# Patient Record
Sex: FEMALE | Race: BLACK OR AFRICAN AMERICAN | NOT HISPANIC OR LATINO | URBAN - METROPOLITAN AREA
[De-identification: names, ages, dates, MRNs, and addresses within clinical notes are randomized per-mention and may not be internally consistent; named-entity substitution may affect disease eponyms.]

---

## 2021-10-27 ENCOUNTER — EMERGENCY (EMERGENCY)
Facility: HOSPITAL | Age: 71
LOS: 1 days | Discharge: HOME | End: 2021-10-27
Attending: EMERGENCY MEDICINE
Payer: MEDICARE

## 2021-10-27 VITALS
DIASTOLIC BLOOD PRESSURE: 81 MMHG | OXYGEN SATURATION: 100 % | SYSTOLIC BLOOD PRESSURE: 165 MMHG | HEART RATE: 84 BPM | HEIGHT: 66 IN | WEIGHT: 119.93 LBS | RESPIRATION RATE: 16 BRPM | TEMPERATURE: 99 F

## 2021-10-27 VITALS
DIASTOLIC BLOOD PRESSURE: 79 MMHG | RESPIRATION RATE: 18 BRPM | OXYGEN SATURATION: 98 % | TEMPERATURE: 98 F | SYSTOLIC BLOOD PRESSURE: 162 MMHG | HEART RATE: 62 BPM

## 2021-10-27 LAB
ALBUMIN SERPL ELPH-MCNC: 3.9 G/DL — SIGNIFICANT CHANGE UP (ref 3.3–5)
ALP SERPL-CCNC: 95 U/L — SIGNIFICANT CHANGE UP (ref 40–120)
ALT FLD-CCNC: 54 U/L — HIGH (ref 10–45)
ANION GAP SERPL CALC-SCNC: 15 MMOL/L — SIGNIFICANT CHANGE UP (ref 5–17)
APPEARANCE UR: CLEAR — SIGNIFICANT CHANGE UP
APTT BLD: 30 SEC — SIGNIFICANT CHANGE UP (ref 27.5–35.5)
AST SERPL-CCNC: 44 U/L — HIGH (ref 10–40)
BACTERIA # UR AUTO: NEGATIVE — SIGNIFICANT CHANGE UP
BASE EXCESS BLDV CALC-SCNC: -0.9 MMOL/L — SIGNIFICANT CHANGE UP (ref -2–2)
BASOPHILS # BLD AUTO: 0.05 K/UL — SIGNIFICANT CHANGE UP (ref 0–0.2)
BASOPHILS NFR BLD AUTO: 0.7 % — SIGNIFICANT CHANGE UP (ref 0–2)
BILIRUB SERPL-MCNC: 0.4 MG/DL — SIGNIFICANT CHANGE UP (ref 0.2–1.2)
BILIRUB UR-MCNC: NEGATIVE — SIGNIFICANT CHANGE UP
BUN SERPL-MCNC: 21 MG/DL — SIGNIFICANT CHANGE UP (ref 7–23)
CA-I SERPL-SCNC: 1.23 MMOL/L — SIGNIFICANT CHANGE UP (ref 1.15–1.33)
CALCIUM SERPL-MCNC: 9.1 MG/DL — SIGNIFICANT CHANGE UP (ref 8.4–10.5)
CHLORIDE BLDV-SCNC: 108 MMOL/L — SIGNIFICANT CHANGE UP (ref 96–108)
CHLORIDE SERPL-SCNC: 106 MMOL/L — SIGNIFICANT CHANGE UP (ref 96–108)
CO2 BLDV-SCNC: 27 MMOL/L — HIGH (ref 22–26)
CO2 SERPL-SCNC: 20 MMOL/L — LOW (ref 22–31)
COLOR SPEC: COLORLESS — SIGNIFICANT CHANGE UP
CREAT SERPL-MCNC: 1.33 MG/DL — HIGH (ref 0.5–1.3)
DIFF PNL FLD: NEGATIVE — SIGNIFICANT CHANGE UP
EOSINOPHIL # BLD AUTO: 0.24 K/UL — SIGNIFICANT CHANGE UP (ref 0–0.5)
EOSINOPHIL NFR BLD AUTO: 3.3 % — SIGNIFICANT CHANGE UP (ref 0–6)
EPI CELLS # UR: 0 /HPF — SIGNIFICANT CHANGE UP
GAS PNL BLDV: 139 MMOL/L — SIGNIFICANT CHANGE UP (ref 136–145)
GAS PNL BLDV: SIGNIFICANT CHANGE UP
GAS PNL BLDV: SIGNIFICANT CHANGE UP
GLUCOSE BLDV-MCNC: 94 MG/DL — SIGNIFICANT CHANGE UP (ref 70–99)
GLUCOSE SERPL-MCNC: 94 MG/DL — SIGNIFICANT CHANGE UP (ref 70–99)
GLUCOSE UR QL: NEGATIVE — SIGNIFICANT CHANGE UP
HCO3 BLDV-SCNC: 25 MMOL/L — SIGNIFICANT CHANGE UP (ref 22–29)
HCT VFR BLD CALC: 31.6 % — LOW (ref 34.5–45)
HCT VFR BLDA CALC: 30 % — LOW (ref 34.5–46.5)
HGB BLD CALC-MCNC: 10 G/DL — LOW (ref 11.7–16.1)
HGB BLD-MCNC: 9.5 G/DL — LOW (ref 11.5–15.5)
HYALINE CASTS # UR AUTO: 0 /LPF — SIGNIFICANT CHANGE UP (ref 0–2)
IMM GRANULOCYTES NFR BLD AUTO: 0.3 % — SIGNIFICANT CHANGE UP (ref 0–1.5)
INR BLD: 1.03 RATIO — SIGNIFICANT CHANGE UP (ref 0.88–1.16)
KETONES UR-MCNC: NEGATIVE — SIGNIFICANT CHANGE UP
LACTATE BLDV-MCNC: 0.9 MMOL/L — SIGNIFICANT CHANGE UP (ref 0.7–2)
LEUKOCYTE ESTERASE UR-ACNC: NEGATIVE — SIGNIFICANT CHANGE UP
LIDOCAIN IGE QN: 65 U/L — HIGH (ref 7–60)
LYMPHOCYTES # BLD AUTO: 1.9 K/UL — SIGNIFICANT CHANGE UP (ref 1–3.3)
LYMPHOCYTES # BLD AUTO: 26.3 % — SIGNIFICANT CHANGE UP (ref 13–44)
MCHC RBC-ENTMCNC: 24.2 PG — LOW (ref 27–34)
MCHC RBC-ENTMCNC: 30.1 GM/DL — LOW (ref 32–36)
MCV RBC AUTO: 80.6 FL — SIGNIFICANT CHANGE UP (ref 80–100)
MONOCYTES # BLD AUTO: 0.58 K/UL — SIGNIFICANT CHANGE UP (ref 0–0.9)
MONOCYTES NFR BLD AUTO: 8 % — SIGNIFICANT CHANGE UP (ref 2–14)
NEUTROPHILS # BLD AUTO: 4.43 K/UL — SIGNIFICANT CHANGE UP (ref 1.8–7.4)
NEUTROPHILS NFR BLD AUTO: 61.4 % — SIGNIFICANT CHANGE UP (ref 43–77)
NITRITE UR-MCNC: NEGATIVE — SIGNIFICANT CHANGE UP
NRBC # BLD: 0 /100 WBCS — SIGNIFICANT CHANGE UP (ref 0–0)
PCO2 BLDV: 48 MMHG — HIGH (ref 39–42)
PH BLDV: 7.33 — SIGNIFICANT CHANGE UP (ref 7.32–7.43)
PH UR: 6 — SIGNIFICANT CHANGE UP (ref 5–8)
PLATELET # BLD AUTO: 161 K/UL — SIGNIFICANT CHANGE UP (ref 150–400)
PO2 BLDV: 34 MMHG — SIGNIFICANT CHANGE UP (ref 25–45)
POTASSIUM BLDV-SCNC: 4.3 MMOL/L — SIGNIFICANT CHANGE UP (ref 3.5–5.1)
POTASSIUM SERPL-MCNC: 3.7 MMOL/L — SIGNIFICANT CHANGE UP (ref 3.5–5.3)
POTASSIUM SERPL-SCNC: 3.7 MMOL/L — SIGNIFICANT CHANGE UP (ref 3.5–5.3)
PROT SERPL-MCNC: 7.5 G/DL — SIGNIFICANT CHANGE UP (ref 6–8.3)
PROT UR-MCNC: ABNORMAL
PROTHROM AB SERPL-ACNC: 12.3 SEC — SIGNIFICANT CHANGE UP (ref 10.6–13.6)
RBC # BLD: 3.92 M/UL — SIGNIFICANT CHANGE UP (ref 3.8–5.2)
RBC # FLD: 15.6 % — HIGH (ref 10.3–14.5)
RBC CASTS # UR COMP ASSIST: 2 /HPF — SIGNIFICANT CHANGE UP (ref 0–4)
SAO2 % BLDV: 52.6 % — LOW (ref 67–88)
SODIUM SERPL-SCNC: 141 MMOL/L — SIGNIFICANT CHANGE UP (ref 135–145)
SP GR SPEC: 1.01 — SIGNIFICANT CHANGE UP (ref 1.01–1.02)
UROBILINOGEN FLD QL: NEGATIVE — SIGNIFICANT CHANGE UP
WBC # BLD: 7.22 K/UL — SIGNIFICANT CHANGE UP (ref 3.8–10.5)
WBC # FLD AUTO: 7.22 K/UL — SIGNIFICANT CHANGE UP (ref 3.8–10.5)
WBC UR QL: 1 /HPF — SIGNIFICANT CHANGE UP (ref 0–5)

## 2021-10-27 PROCEDURE — 99284 EMERGENCY DEPT VISIT MOD MDM: CPT | Mod: 25

## 2021-10-27 PROCEDURE — 83605 ASSAY OF LACTIC ACID: CPT

## 2021-10-27 PROCEDURE — 85014 HEMATOCRIT: CPT

## 2021-10-27 PROCEDURE — 82435 ASSAY OF BLOOD CHLORIDE: CPT

## 2021-10-27 PROCEDURE — 87086 URINE CULTURE/COLONY COUNT: CPT

## 2021-10-27 PROCEDURE — 82330 ASSAY OF CALCIUM: CPT

## 2021-10-27 PROCEDURE — 85730 THROMBOPLASTIN TIME PARTIAL: CPT

## 2021-10-27 PROCEDURE — 96375 TX/PRO/DX INJ NEW DRUG ADDON: CPT

## 2021-10-27 PROCEDURE — G1004: CPT

## 2021-10-27 PROCEDURE — 81001 URINALYSIS AUTO W/SCOPE: CPT

## 2021-10-27 PROCEDURE — 85610 PROTHROMBIN TIME: CPT

## 2021-10-27 PROCEDURE — 36415 COLL VENOUS BLD VENIPUNCTURE: CPT

## 2021-10-27 PROCEDURE — 84132 ASSAY OF SERUM POTASSIUM: CPT

## 2021-10-27 PROCEDURE — 82803 BLOOD GASES ANY COMBINATION: CPT

## 2021-10-27 PROCEDURE — 96374 THER/PROPH/DIAG INJ IV PUSH: CPT | Mod: XU

## 2021-10-27 PROCEDURE — 84295 ASSAY OF SERUM SODIUM: CPT

## 2021-10-27 PROCEDURE — 82947 ASSAY GLUCOSE BLOOD QUANT: CPT

## 2021-10-27 PROCEDURE — 85018 HEMOGLOBIN: CPT

## 2021-10-27 PROCEDURE — 74177 CT ABD & PELVIS W/CONTRAST: CPT | Mod: ME

## 2021-10-27 PROCEDURE — 83690 ASSAY OF LIPASE: CPT

## 2021-10-27 PROCEDURE — 96376 TX/PRO/DX INJ SAME DRUG ADON: CPT

## 2021-10-27 PROCEDURE — 74177 CT ABD & PELVIS W/CONTRAST: CPT | Mod: 26,ME

## 2021-10-27 PROCEDURE — 85025 COMPLETE CBC W/AUTO DIFF WBC: CPT

## 2021-10-27 PROCEDURE — 80053 COMPREHEN METABOLIC PANEL: CPT

## 2021-10-27 PROCEDURE — 99285 EMERGENCY DEPT VISIT HI MDM: CPT

## 2021-10-27 RX ORDER — SUCRALFATE 1 G
1 TABLET ORAL
Qty: 56 | Refills: 0
Start: 2021-10-27 | End: 2021-11-09

## 2021-10-27 RX ORDER — ACETAMINOPHEN 500 MG
1000 TABLET ORAL ONCE
Refills: 0 | Status: COMPLETED | OUTPATIENT
Start: 2021-10-27 | End: 2021-10-27

## 2021-10-27 RX ORDER — MAGNESIUM HYDROXIDE 400 MG/1
15 TABLET, CHEWABLE ORAL
Qty: 150 | Refills: 0
Start: 2021-10-27

## 2021-10-27 RX ORDER — ONDANSETRON 8 MG/1
4 TABLET, FILM COATED ORAL ONCE
Refills: 0 | Status: COMPLETED | OUTPATIENT
Start: 2021-10-27 | End: 2021-10-27

## 2021-10-27 RX ORDER — PANTOPRAZOLE SODIUM 20 MG/1
1 TABLET, DELAYED RELEASE ORAL
Qty: 14 | Refills: 0
Start: 2021-10-27 | End: 2021-11-09

## 2021-10-27 RX ORDER — MORPHINE SULFATE 50 MG/1
2 CAPSULE, EXTENDED RELEASE ORAL ONCE
Refills: 0 | Status: DISCONTINUED | OUTPATIENT
Start: 2021-10-27 | End: 2021-10-27

## 2021-10-27 RX ORDER — SODIUM CHLORIDE 9 MG/ML
500 INJECTION INTRAMUSCULAR; INTRAVENOUS; SUBCUTANEOUS ONCE
Refills: 0 | Status: COMPLETED | OUTPATIENT
Start: 2021-10-27 | End: 2021-10-27

## 2021-10-27 RX ORDER — MAGNESIUM HYDROXIDE 400 MG/1
15 TABLET, CHEWABLE ORAL
Qty: 1 | Refills: 0
Start: 2021-10-27

## 2021-10-27 RX ADMIN — Medication 400 MILLIGRAM(S): at 12:23

## 2021-10-27 RX ADMIN — MORPHINE SULFATE 2 MILLIGRAM(S): 50 CAPSULE, EXTENDED RELEASE ORAL at 12:24

## 2021-10-27 RX ADMIN — MORPHINE SULFATE 2 MILLIGRAM(S): 50 CAPSULE, EXTENDED RELEASE ORAL at 11:07

## 2021-10-27 RX ADMIN — SODIUM CHLORIDE 500 MILLILITER(S): 9 INJECTION INTRAMUSCULAR; INTRAVENOUS; SUBCUTANEOUS at 11:07

## 2021-10-27 RX ADMIN — ONDANSETRON 4 MILLIGRAM(S): 8 TABLET, FILM COATED ORAL at 11:08

## 2021-10-27 NOTE — ED ADULT NURSE NOTE - OBJECTIVE STATEMENT
71 y/o female coming in via EMS from nursing home complaining of abdominal pain. AOx1-2, ambulates with cane and walker, PMH alzheimers, gall stones s/p gall bladder removal, kidney stones, HTN, HLD, DM on insulin. Pt. reports sudden onset left lower quadrant abdominal pain that began yesterday with associated nausea/vomiting. LBM yesterday. Abdomen soft, non-distended, tender to palpation in left lower quadrants. States she has pain with urination since yesterday. Denies fevers/chills. Denies any other complaints. Denies chest pain, SOB. Pt. is well appearing. pt. is confused asking repetitive questions, but oriented to person and place. This is baseline as per nursing home paperwork. VSS. 20G RAC. Will continue to reassess.

## 2021-10-27 NOTE — ED PROVIDER NOTE - CLINICAL SUMMARY MEDICAL DECISION MAKING FREE TEXT BOX
70 y old f with multiple medical issues came in with abdominal pain nausea and vomiting ,no fever ,chills ,hematuria ,will obtain blood work ,CT scan ua ,pain meds ,hydration ,reassess ZR

## 2021-10-27 NOTE — ED PROVIDER NOTE - NSFOLLOWUPINSTRUCTIONS_ED_ALL_ED_FT
Ms. Ribeiro is a 65 year old woman who presented with shortness of breath that had been present for 3 days, pleuritic chest pain, dry cough and chills.  She was using an albuterol nebulizer every 4 hours without relief.  Patient was discharged from a hospital at her home in [place] 2 weeks ago where she was treated for a COPD exacerbation and was discharged on home oxygen, but she was unable to arrange for oxygen to be brought with her on the plane so she came to visit her son without it.  On presentation here she was febrile to 100.8 and oxygen saturation was 87% on room air.  Labs showed a microcytic anemia.  CXR showed multifocal airspace opacities.    Medical history includes diabetes and hypertension.  She has thalassemia minor and is treated by a hematologist with oral iron and iron infusions.  She quit smoking at least 30 years ago.  
Follow up with Gastroenterology.  Take over the counter Milk of Magnesia as needed. Start the protonix. Carafate 4xday. Stop eating spicy and acidic foods. Eat smaller meals more frequently.  Worsening pain, new fever, chills, nausea, vomiting, new chest pain/shortness of breath return to ER

## 2021-10-27 NOTE — ED ADULT NURSE REASSESSMENT NOTE - NS ED NURSE REASSESS COMMENT FT1
Pt. straight catheterized for sterile urine sample as per MD orders. Two RN present to confirm sterility of procedure. Approx. 300 cc's light yellow clear urine drained. Procedure completed without difficulty. Urine specimen obtained and sent to lab. Will continue to reassess.

## 2021-10-27 NOTE — ED PROVIDER NOTE - PHYSICAL EXAMINATION
Gen: AAO x 3, NAD  Skin: No rashes or lesions  HEENT: NC/AT, PERRLA, EOMI, MMM  Resp: unlabored CTAB  Cardiac: rrr s1s2, no murmurs, rubs or gallops  GI: ND, +BS, Soft, +LLQ and L mid abd abd ttp NO CVAT  Ext: no pedal edema, FROM in all extremities  Neuro: no focal deficits

## 2021-10-27 NOTE — ED PROVIDER NOTE - OBJECTIVE STATEMENT
70yof pmhx of HTN HLD DM on insulin TIA CVA Anxiety depression Dementia hx of cholecystectomy BIB EMS from NH for 2 days of L sided abd pain associated with nausea and vomiting and dysuria. No fever or chills. No back pain no hematuria. Reports mild improved with tylenol. No diarrhea. Normal BM yesterday.

## 2021-10-27 NOTE — ED PROVIDER NOTE - PATIENT PORTAL LINK FT
You can access the FollowMyHealth Patient Portal offered by [facility] by registering at the following website: [email or web address]. By joining [employer]’s FollowMyHealth portal, you will also be able to view your health information using other applications (apps) compatible with our system.

## 2021-10-27 NOTE — CONSULT NOTE ADULT - ASSESSMENT
70yof pmhx of HTN HLD DM on insulin TIA CVA Anxiety depression Dementia past surgical history of gastric bypass and [place] many years ago, cholecystectomy many years ago, and endoscopy with possible intervention 10 years ago as per patient, BIB EMS from NH for 2 days of L sided abd pain associated with nausea, vomiting and dysuria.    - No acute surgical intervention at this time  - Recommend admission to medicine for workup of pneumobilia in setting of intervention over ten years ago  - Recommend miralax for stool burden  - Recommend GI consult for possible EDG to evaluate for marginal ulcer  - Discussed with fellow    [id number]  70yof pmhx of HTN HLD DM on insulin TIA CVA Anxiety depression Dementia past surgical history of gastric bypass and [place] many years ago, cholecystectomy many years ago, and endoscopy with possible intervention 10 years ago as per patient, BIB EMS from NH for 2 days of L sided abd pain associated with nausea, vomiting and dysuria.    - No acute surgical intervention at this time  - Recommend PPI BID and karafate  - Recommend GI consult for possible EDG to evaluate for marginal ulcer  - Discussed with fellow    [id number]  70yof pmhx of HTN HLD DM on insulin TIA CVA Anxiety depression Dementia past surgical history of gastric bypass and [place] many years ago, cholecystectomy many years ago, and endoscopy with possible intervention 10 years ago as per patient, BIB EMS from NH for 2 days of L sided abd pain associated with nausea, vomiting and dysuria.    - No acute surgical intervention at this time  - Recommend PPI BID and karafate  - Recommend GI consult for possible EDG to evaluate for marginal ulcer  - Discussed with fellow    Fellow Vin Note:  D/W ED Att.  Agree with the plan.  The Pneumobilia needs to be addressed (Gi Consult)     [id number]

## 2021-10-27 NOTE — ED PROVIDER NOTE - PROGRESS NOTE DETAILS
pt resting feeling much better. tolerated whole meal. CT scan with moderate constipation no other acute pathology. Seen by surgery team for prior hx of gastric bypass. Results discussed with daughter. Non emergent started for pt to return to [facility]. -MERY Bustillos pt resting feeling much better. tolerated whole meal. CT scan with moderate constipation no other acute pathology. Seen by surgery team for prior hx of gastric bypass. Results discussed with daughter. Non emergent started for pt to return to [facility]. pt to be started on PPI, carafate 4xday, Milk of Magnesia. pt to follow up with GI at the NH. strict return precautions advised. -MERY Bustillos

## 2021-10-27 NOTE — ED ADULT NURSE NOTE - NSIMPLEMENTINTERV_GEN_ALL_ED
Implemented All Fall Risk Interventions:  [place] to call system. Call bell, personal items and telephone within reach. Instruct patient to call for assistance. Room bathroom lighting operational. Non-slip footwear when patient is off stretcher. Physically safe environment: no spills, clutter or unnecessary equipment. Stretcher in lowest position, wheels locked, appropriate side rails in place. Provide visual cue, wrist band, yellow gown, etc. Monitor gait and stability. Monitor for mental status changes and reorient to person, place, and time. Review medications for side effects contributing to fall risk. Reinforce activity limits and safety measures with patient and family.

## 2021-10-27 NOTE — CONSULT NOTE ADULT - SUBJECTIVE AND OBJECTIVE BOX
HPI:    70yof pmhx of HTN HLD DM on insulin TIA CVA Anxiety depression Dementia past surgical history of gastric bypass and [place] many years ago, cholecystectomy many years ago, and endoscopy with possible intervention 10 years ago as per patient, BIB EMS from NH for 2 days of L sided abd pain associated with nausea, vomiting and dysuria. Patient states pain started yesterday in LUQ and was associated with one episode of NBNB emesis. Patient is sharp in nature and non radiating. Patient states last passed gas and had a bowel movement yesterday. Patient states that had gastric bypass in [place] many years ago, a cholecystectomy many years ago and an endoscopy, does not remember timing, hospital or surgeon who performed surgeries. In ED, patient hemodynamically stable, WBC and lactate WNL, CT scan showing stool burden, pneumobilia, but no other intra abdominal pathology. Patient denies chest pain, SOB, fever, chills, nausea, and dizziness.    PAST MEDICAL & SURGICAL HISTORY:      MEDICATIONS  (STANDING):    MEDICATIONS  (PRN):      Allergies    Keflex (Rash)  penicillins (Rash)  Quinapril Hydrochloride (Rash)    Intolerances        SOCIAL HISTORY:    FAMILY HISTORY:      PHYSICAL EXAM:  Constitutional: well developed, well nourished, NAD  Eyes: anicteric  ENMT: normal facies, symmetric  Respiratory: Breathing comfortably    Gastrointestinal: abdomen soft, tender to palpation in LUQ, nondistended.   Extremities: FROM, warm  Neurological: intact, non-focal  Psychiatric: oriented x 3; appropriate      Vital Signs Last 24 Hrs  T(C): 36.8 (27 Oct 2021 15:20), Max: 37 (27 Oct 2021 10:32)  T(F): 98.3 (27 Oct 2021 15:20), Max: 98.6 (27 Oct 2021 10:32)  HR: 67 (27 Oct 2021 15:20) (66 - 84)  BP: 136/89 (27 Oct 2021 15:20) (136/89 - 165/85)  BP(mean): --  RR: 17 (27 Oct 2021 15:20) (16 - 18)  SpO2: 99% (27 Oct 2021 15:20) (99% - 100%)    I&O's Summary          LABS:                        9.5    7.22  )-----------( 161      ( 27 Oct 2021 11:05 )             31.6     10-27    141  |  106  |  21  ----------------------------<  94  3.7   |  20<L>  |  1.33<H>    Ca    9.1      27 Oct 2021 11:05    TPro  7.5  /  Alb  3.9  /  TBili  0.4  /  DBili  x   /  AST  44<H>  /  ALT  54<H>  /  AlkPhos  95  10-27    PT/INR - ( 27 Oct 2021 11:05 )   PT: 12.3 sec;   INR: 1.03 ratio         PTT - ( 27 Oct 2021 11:05 )  PTT:30.0 sec  Urinalysis Basic - ( 27 Oct 2021 12:54 )    Color: Colorless / Appearance: Clear / S.014 / pH: x  Gluc: x / Ketone: Negative  / Bili: Negative / Urobili: Negative   Blood: x / Protein: Trace / Nitrite: Negative   Leuk Esterase: Negative / RBC: 2 /hpf / WBC 1 /HPF   Sq Epi: x / Non Sq Epi: 0 /hpf / Bacteria: Negative      CAPILLARY BLOOD GLUCOSE        LIVER FUNCTIONS - ( 27 Oct 2021 11:05 )  Alb: 3.9 g/dL / Pro: 7.5 g/dL / ALK PHOS: 95 U/L / ALT: 54 U/L / AST: 44 U/L / GGT: x             Cultures:      RADIOLOGY & ADDITIONAL STUDIES:      EXAM:  CT ABDOMEN AND PELVIS OC IC                            PROCEDURE DATE:  10/27/2021            INTERPRETATION:  CLINICAL INFORMATION: Left-sided abdominal pain, nausea, vomiting. Dysuria.    COMPARISON: None.    PROCEDURE:  CT of the Abdomen and Pelvis was performed.  Sagittal and coronal reformats were performed.    FINDINGS:  LOWER CHEST: Within normal limits.    LIVER: Within normal limits.  BILE DUCTS: Mild central pneumobilia, compatible with prior sphincterotomy or papillotomy.  GALLBLADDER: Removed.  SPLEEN: Within normal limits.  PANCREAS: Within normal limits.  ADRENALS: Within normal limits.  KIDNEYS/URETERS: Within 2 mm non obstructing stone in the lower pole of the left kidney. Small bilateral renal cysts. Mild bilateral renal cortical thinning. Otherwise, the kidneys are symmetric in appearance and enhancement with no evidence of hydronephrosis, mass, or perinephric stranding bilaterally..    BLADDER: Within normal limits.  REPRODUCTIVE ORGANS: The uterus has been removed.    BOWEL: Status post gastric bypass. There is moderate stool the colon. Small and large bowel loops are unremarkable with no evidence of obstruction, bowel wall thickening, or inflammatory stranding of the mesenteric fat. The appendix is unremarkable.  PERITONEUM: No ascites.  VESSELS: Within normal limits.  RETROPERITONEUM/LYMPH NODES: No lymphadenopathy.  ABDOMINAL WALL: Within normal limits.  BONES: Bilateral sacroiliitis. Degenerative remodeling of the endplates of the lumbar spine. Schmorl's nodes of the thoracic spine.    IMPRESSION: No evidence of acute inflammatory or obstructive process in the abdomen and pelvis.    --- End of Report ---

## 2021-10-28 LAB
CULTURE RESULTS: NO GROWTH — SIGNIFICANT CHANGE UP
SPECIMEN SOURCE: SIGNIFICANT CHANGE UP

## 2022-06-20 PROBLEM — Z00.00 ENCOUNTER FOR PREVENTIVE HEALTH EXAMINATION: Status: ACTIVE | Noted: 2022-06-20

## 2022-07-07 ENCOUNTER — APPOINTMENT (OUTPATIENT)
Dept: GASTROENTEROLOGY | Facility: CLINIC | Age: 72
End: 2022-07-07

## 2024-09-23 ENCOUNTER — EMERGENCY (EMERGENCY)
Facility: HOSPITAL | Age: 74
LOS: 1 days | Discharge: HOME | End: 2024-09-23
Attending: STUDENT IN AN ORGANIZED HEALTH CARE EDUCATION/TRAINING PROGRAM | Admitting: STUDENT IN AN ORGANIZED HEALTH CARE EDUCATION/TRAINING PROGRAM
Payer: MEDICAID

## 2024-09-23 VITALS
SYSTOLIC BLOOD PRESSURE: 219 MMHG | HEART RATE: 67 BPM | TEMPERATURE: 98 F | DIASTOLIC BLOOD PRESSURE: 82 MMHG | RESPIRATION RATE: 18 BRPM | HEIGHT: 64 IN | WEIGHT: 149.91 LBS

## 2024-09-23 VITALS
HEART RATE: 55 BPM | DIASTOLIC BLOOD PRESSURE: 76 MMHG | RESPIRATION RATE: 16 BRPM | SYSTOLIC BLOOD PRESSURE: 164 MMHG | OXYGEN SATURATION: 98 %

## 2024-09-23 LAB
ALBUMIN SERPL ELPH-MCNC: 3.1 G/DL — LOW (ref 3.3–5)
ALP SERPL-CCNC: 136 U/L — HIGH (ref 40–120)
ALT FLD-CCNC: 55 U/L — HIGH (ref 10–45)
ANION GAP SERPL CALC-SCNC: 9 MMOL/L — SIGNIFICANT CHANGE UP (ref 5–17)
AST SERPL-CCNC: 44 U/L — HIGH (ref 10–40)
BASOPHILS # BLD AUTO: 0.05 K/UL — SIGNIFICANT CHANGE UP (ref 0–0.2)
BASOPHILS NFR BLD AUTO: 0.6 % — SIGNIFICANT CHANGE UP (ref 0–2)
BILIRUB SERPL-MCNC: 0.4 MG/DL — SIGNIFICANT CHANGE UP (ref 0.2–1.2)
BUN SERPL-MCNC: 27 MG/DL — HIGH (ref 7–23)
CALCIUM SERPL-MCNC: 8.9 MG/DL — SIGNIFICANT CHANGE UP (ref 8.4–10.5)
CHLORIDE SERPL-SCNC: 107 MMOL/L — SIGNIFICANT CHANGE UP (ref 96–108)
CO2 SERPL-SCNC: 22 MMOL/L — SIGNIFICANT CHANGE UP (ref 22–31)
CREAT SERPL-MCNC: 2.03 MG/DL — HIGH (ref 0.5–1.3)
EGFR: 26 ML/MIN/1.73M2 — LOW
EOSINOPHIL # BLD AUTO: 0.23 K/UL — SIGNIFICANT CHANGE UP (ref 0–0.5)
EOSINOPHIL NFR BLD AUTO: 2.9 % — SIGNIFICANT CHANGE UP (ref 0–6)
GLUCOSE SERPL-MCNC: 179 MG/DL — HIGH (ref 70–99)
HCT VFR BLD CALC: 36.5 % — SIGNIFICANT CHANGE UP (ref 34.5–45)
HGB BLD-MCNC: 11.1 G/DL — LOW (ref 11.5–15.5)
IMM GRANULOCYTES NFR BLD AUTO: 0.3 % — SIGNIFICANT CHANGE UP (ref 0–0.9)
LYMPHOCYTES # BLD AUTO: 1.79 K/UL — SIGNIFICANT CHANGE UP (ref 1–3.3)
LYMPHOCYTES # BLD AUTO: 22.5 % — SIGNIFICANT CHANGE UP (ref 13–44)
MCHC RBC-ENTMCNC: 25.9 PG — LOW (ref 27–34)
MCHC RBC-ENTMCNC: 30.4 GM/DL — LOW (ref 32–36)
MCV RBC AUTO: 85.3 FL — SIGNIFICANT CHANGE UP (ref 80–100)
MONOCYTES # BLD AUTO: 0.53 K/UL — SIGNIFICANT CHANGE UP (ref 0–0.9)
MONOCYTES NFR BLD AUTO: 6.7 % — SIGNIFICANT CHANGE UP (ref 2–14)
NEUTROPHILS # BLD AUTO: 5.32 K/UL — SIGNIFICANT CHANGE UP (ref 1.8–7.4)
NEUTROPHILS NFR BLD AUTO: 67 % — SIGNIFICANT CHANGE UP (ref 43–77)
NRBC # BLD: 0 /100 WBCS — SIGNIFICANT CHANGE UP (ref 0–0)
PLATELET # BLD AUTO: 179 K/UL — SIGNIFICANT CHANGE UP (ref 150–400)
POTASSIUM SERPL-MCNC: 4.2 MMOL/L — SIGNIFICANT CHANGE UP (ref 3.5–5.3)
POTASSIUM SERPL-SCNC: 4.2 MMOL/L — SIGNIFICANT CHANGE UP (ref 3.5–5.3)
PROT SERPL-MCNC: 8.1 G/DL — SIGNIFICANT CHANGE UP (ref 6–8.3)
RBC # BLD: 4.28 M/UL — SIGNIFICANT CHANGE UP (ref 3.8–5.2)
RBC # FLD: 14.6 % — HIGH (ref 10.3–14.5)
SODIUM SERPL-SCNC: 138 MMOL/L — SIGNIFICANT CHANGE UP (ref 135–145)
WBC # BLD: 7.94 K/UL — SIGNIFICANT CHANGE UP (ref 3.8–10.5)
WBC # FLD AUTO: 7.94 K/UL — SIGNIFICANT CHANGE UP (ref 3.8–10.5)

## 2024-09-23 PROCEDURE — 99284 EMERGENCY DEPT VISIT MOD MDM: CPT | Mod: 25

## 2024-09-23 PROCEDURE — 85025 COMPLETE CBC W/AUTO DIFF WBC: CPT

## 2024-09-23 PROCEDURE — 80053 COMPREHEN METABOLIC PANEL: CPT

## 2024-09-23 PROCEDURE — 96376 TX/PRO/DX INJ SAME DRUG ADON: CPT

## 2024-09-23 PROCEDURE — 99284 EMERGENCY DEPT VISIT MOD MDM: CPT

## 2024-09-23 PROCEDURE — 96361 HYDRATE IV INFUSION ADD-ON: CPT

## 2024-09-23 PROCEDURE — 72131 CT LUMBAR SPINE W/O DYE: CPT | Mod: 26,MC

## 2024-09-23 PROCEDURE — 72131 CT LUMBAR SPINE W/O DYE: CPT | Mod: MC

## 2024-09-23 PROCEDURE — 70450 CT HEAD/BRAIN W/O DYE: CPT | Mod: MC

## 2024-09-23 PROCEDURE — 36415 COLL VENOUS BLD VENIPUNCTURE: CPT

## 2024-09-23 PROCEDURE — 70450 CT HEAD/BRAIN W/O DYE: CPT | Mod: 26,MC

## 2024-09-23 PROCEDURE — 96365 THER/PROPH/DIAG IV INF INIT: CPT

## 2024-09-23 PROCEDURE — 96375 TX/PRO/DX INJ NEW DRUG ADDON: CPT

## 2024-09-23 RX ORDER — ACETAMINOPHEN 325 MG/1
1000 TABLET ORAL ONCE
Refills: 0 | Status: COMPLETED | OUTPATIENT
Start: 2024-09-23 | End: 2024-09-23

## 2024-09-23 RX ORDER — ACETAMINOPHEN 325 MG/1
1000 TABLET ORAL ONCE
Refills: 0 | Status: DISCONTINUED | OUTPATIENT
Start: 2024-09-23 | End: 2024-09-23

## 2024-09-23 RX ORDER — HYDRALAZINE HCL 50 MG
10 TABLET ORAL ONCE
Refills: 0 | Status: COMPLETED | OUTPATIENT
Start: 2024-09-23 | End: 2024-09-23

## 2024-09-23 RX ORDER — SODIUM CHLORIDE 9 MG/ML
500 INJECTION INTRAMUSCULAR; INTRAVENOUS; SUBCUTANEOUS ONCE
Refills: 0 | Status: COMPLETED | OUTPATIENT
Start: 2024-09-23 | End: 2024-09-23

## 2024-09-23 RX ORDER — METOCLOPRAMIDE HCL 5 MG
10 TABLET ORAL ONCE
Refills: 0 | Status: COMPLETED | OUTPATIENT
Start: 2024-09-23 | End: 2024-09-23

## 2024-09-23 RX ORDER — LIDOCAINE/BENZALKONIUM/ALCOHOL
1 SOLUTION, NON-ORAL TOPICAL ONCE
Refills: 0 | Status: COMPLETED | OUTPATIENT
Start: 2024-09-23 | End: 2024-09-23

## 2024-09-23 RX ORDER — ONDANSETRON 2 MG/ML
4 INJECTION, SOLUTION INTRAMUSCULAR; INTRAVENOUS ONCE
Refills: 0 | Status: COMPLETED | OUTPATIENT
Start: 2024-09-23 | End: 2024-09-23

## 2024-09-23 RX ADMIN — SODIUM CHLORIDE 500 MILLILITER(S): 9 INJECTION INTRAMUSCULAR; INTRAVENOUS; SUBCUTANEOUS at 01:44

## 2024-09-23 RX ADMIN — ACETAMINOPHEN 1000 MILLIGRAM(S): 325 TABLET ORAL at 01:15

## 2024-09-23 RX ADMIN — Medication 4 MILLIGRAM(S): at 00:44

## 2024-09-23 RX ADMIN — Medication 10 MILLIGRAM(S): at 02:34

## 2024-09-23 RX ADMIN — Medication 4 MILLIGRAM(S): at 02:00

## 2024-09-23 RX ADMIN — Medication 10 MILLIGRAM(S): at 05:21

## 2024-09-23 RX ADMIN — SODIUM CHLORIDE 500 MILLILITER(S): 9 INJECTION INTRAMUSCULAR; INTRAVENOUS; SUBCUTANEOUS at 00:44

## 2024-09-23 RX ADMIN — Medication 4 MILLIGRAM(S): at 01:15

## 2024-09-23 RX ADMIN — ONDANSETRON 4 MILLIGRAM(S): 2 INJECTION, SOLUTION INTRAMUSCULAR; INTRAVENOUS at 02:38

## 2024-09-23 RX ADMIN — ACETAMINOPHEN 400 MILLIGRAM(S): 325 TABLET ORAL at 00:44

## 2024-09-23 RX ADMIN — ACETAMINOPHEN 1000 MILLIGRAM(S): 325 TABLET ORAL at 01:00

## 2024-09-23 RX ADMIN — Medication 4 MILLIGRAM(S): at 01:22

## 2024-09-23 NOTE — ED PROVIDER NOTE - PATIENT PORTAL LINK FT
You can access the FollowMyHealth Patient Portal offered by [facility] by registering at the following website: [email or web address]. By joining [facility] [employer] [facility]’s FollowMyHealth portal, you will also be able to view your health information using other applications (apps) compatible with our system.

## 2024-09-23 NOTE — ED ADULT NURSE NOTE - CHIEF COMPLAINT QUOTE
Patient BIBEMS from [facility] s/p fall, now endorsing pain to lower back. Unknown headstrike or LOC, takes plavis. PMHx stroke, HTN, dementia

## 2024-09-23 NOTE — ED ADULT NURSE NOTE - NSFALLRISKINTERV_ED_ALL_ED
Assistance OOB with selected safe patient handling equipment if applicable/Communicate fall risk and risk factors to all staff, patient, and family/Encourage patient to sit up slowly, dangle for a short time, stand at bedside before walking/Monitor for mental status changes and reorient to person, place, and time, as needed/Move patient closer to nursing station/within visual sight of ED staff/Orthostatic vital signs/Provide patient with walking aids/Provide visual cue: yellow wristband, yellow gown, etc/Reinforce activity limits and safety measures with patient and family/Review medications for side effects contributing to fall risk/Toileting schedule using arm’s reach rule for commode and bathroom/Use of alarms - bed, stretcher, chair and/or video monitoring/Call bell, personal items and telephone in reach/Instruct patient to call for assistance before getting out of bed/chair/stretcher/Non-slip footwear applied when patient is off stretcher/[place] to call system/Physically safe environment - no spills, clutter or unnecessary equipment/Purposeful Proactive Rounding/Room/bathroom lighting operational, light cord in reach

## 2024-09-23 NOTE — ED ADULT TRIAGE NOTE - CHIEF COMPLAINT QUOTE
Patient BIBEMS from [facility] s/p fall, now endorsing pain to lower back. Unknown headstrike or LOC, takes plavis. PMHx stroke, HTN. Patient BIBEMS from [facility] s/p fall, now endorsing pain to lower back. Unknown headstrike or LOC, takes plavis. PMHx stroke, HTN, dementia

## 2024-09-23 NOTE — ED PROVIDER NOTE - PHYSICAL EXAMINATION
VITAL SIGNS: I have reviewed nursing notes and confirm.   GEN: Well-developed; well-nourished; in no acute distress. Speaking full sentences.  SKIN: Warm, pink, no rash, no diaphoresis, no cyanosis, well perfused.   HEAD: Normocephalic; atraumatic.    NECK: Supple; non tender.   EYES: Pupils 3mm equal, round, reactive to light and accomodation, conjunctiva and sclera clear. Extra-ocular movements intact bilaterally.  ENT: No nasal discharge; airway clear. Trachea is midline.    CV: RRR. S1, S2 normal; no murmurs, gallops, or rubs. Capillary refill < 2 seconds throughout. Distal pulses intact 2+ throughout.  RESP: CTA bilaterally. No wheezes, rales, or rhonchi.   ABD: Normal bowel sounds, soft, non-distended, non-tender, no rebound, no guarding, no rigidity   MSK: Normal range of motion and movement of all 4 extremities. No apparent joint or muscular pain throughout.    BACK: No thoracolumbar midline or (+) paralumbar b/l ttp mild lower, no step offs, no other paravertebral ttp. No rashes.   NEURO: Alert & oriented x 3, Grossly unremarkable. Sensory and motor intact throughout. No focal deficits. Gait: Fluid. Normal speech and coordination.

## 2024-09-23 NOTE — ED PROVIDER NOTE - CARE PROVIDERS DIRECT ADDRESSES
[email or web address]

## 2024-09-23 NOTE — ED PROVIDER NOTE - NSFOLLOWUPINSTRUCTIONS_ED_ALL_ED_FT
Back Pain    Back pain is very common in adults. The cause of back pain is rarely dangerous and the pain often gets better over time. The cause of your back pain may not be known and may include strain of muscles or ligaments, degeneration of the spinal disks, or arthritis. Occasionally the pain may radiate down your leg(s). Over-the-counter medicines to reduce pain and inflammation are often the most helpful. Stretching and remaining active frequently helps the healing process.     SEEK IMMEDIATE MEDICAL CARE IF YOU HAVE ANY OF THE FOLLOWING SYMPTOMS: bowel or bladder control problems, unusual weakness or numbness in your arms or legs, nausea or vomiting, abdominal pain, fever, dizziness/lightheadedness.     Take acetaminophen 650 mg orally every 6-8 hours for pain control as needed. Please do not exceed 4,000 mg of acetaminophen during a 24 hours period. Acetaminophen can be found in many over-the-counter cold medications as well as opioid medications that may be given for pain.    Recommend physical therapy for lower back pain. Back Pain    Back pain is very common in adults. The cause of back pain is rarely dangerous and the pain often gets better over time. The cause of your back pain may not be known and may include strain of muscles or ligaments, degeneration of the spinal disks, or arthritis. Occasionally the pain may radiate down your leg(s). Over-the-counter medicines to reduce pain and inflammation are often the most helpful. Stretching and remaining active frequently helps the healing process.     SEEK IMMEDIATE MEDICAL CARE IF YOU HAVE ANY OF THE FOLLOWING SYMPTOMS: bowel or bladder control problems, unusual weakness or numbness in your arms or legs, nausea or vomiting, abdominal pain, fever, dizziness/lightheadedness.     Take acetaminophen 650 mg orally every 6-8 hours for pain control as needed. Please do not exceed 4,000 mg of acetaminophen during a 24 hours period. Acetaminophen can be found in many over-the-counter cold medications as well as opioid medications that may be given for pain.    Recommend physical therapy for lower back pain.    Follow up with primary care doctor in 2-5 days for further re-evaluation of your elevated mild liver function enzymes and chronic kidney dysfunction. Back Pain    Back pain is very common in adults. The cause of back pain is rarely dangerous and the pain often gets better over time. The cause of your back pain may not be known and may include strain of muscles or ligaments, degeneration of the spinal disks, or arthritis. Occasionally the pain may radiate down your leg(s). Over-the-counter medicines to reduce pain and inflammation are often the most helpful. Stretching and remaining active frequently helps the healing process.     SEEK IMMEDIATE MEDICAL CARE IF YOU HAVE ANY OF THE FOLLOWING SYMPTOMS: bowel or bladder control problems, unusual weakness or numbness in your arms or legs, nausea or vomiting, abdominal pain, fever, dizziness/lightheadedness.     Follow up with urology in 2-5 days for cotton catheter removal and further evaluation of your urinary retention.    Take acetaminophen 650 mg orally every 6-8 hours for pain control as needed. Please do not exceed 4,000 mg of acetaminophen during a 24 hours period. Acetaminophen can be found in many over-the-counter cold medications as well as opioid medications that may be given for pain.    Recommend physical therapy for lower back pain.    Follow up with primary care doctor in 2-5 days for further re-evaluation of your elevated mild liver function enzymes and chronic kidney dysfunction.

## 2024-09-23 NOTE — ED PROVIDER NOTE - CARE PLAN
Principal Discharge DX:	Back pain   1 Principal Discharge DX:	Back pain  Secondary Diagnosis:	Urinary retention

## 2024-09-23 NOTE — ED ADULT NURSE REASSESSMENT NOTE - NS ED NURSE REASSESS COMMENT FT1
Patient with continued pain. Noted to be tearful and yelling. MD made aware of blood pressure. Medicated as per eMAR.

## 2024-09-23 NOTE — ED PROVIDER NOTE - CLINICAL SUMMARY MEDICAL DECISION MAKING FREE TEXT BOX
73F PMHx of Hypertension, hyperlipidemia, diabetes on insulin, TIA/CVA, anxiety/depression, Alzheimer's dementia, status post gastric bypass from [place], cholecystectomy, presenting with lower left back pain for the past day status post mechanical fall at [facility]. Per staff they report that they found her on the floor in her room complaining of back pain. Of note she has had chronic lower back pain for the past few months. She is reporting mild lower b/l lumbar pain nonradiating, that is positional. Otherwise, the patient denies any neck pain, headaches, chest pain, shortness of breath, n/v/d, abdominal pain, recent illness, fevers, chills, recent spinal/back surgeries or procedures, bowel or bladder incontinence, bowel or bladder retention, constipation, IV drug use, known cancer history, recent weight loss,   other subjective neurological deficits, numbness/tingling, dysuria, hematuria, rashes.     Pt p/w atraumatic low back pain with (-) fevers, chills, (-) saddle anesthesia or perianal numbness,(-) rash, (-) IVDU hx, (-) urinary or bowel incontinence/retention, or focal neurological deficits. DDx: Likely musculoskeletal back pain vs disc herniation/radiculopathy. DDX includes but is not limited to: cauda equina syndrome, vertebral compression fracture, epidural abscess, discitis, vertebral osteomyelitis, cord compression, or bone malignancy; but these are unlikely due to the HPI and exam.   - Pain control: steroids, lidocaine patch, NSAIDs, opioid PRN   - CBC/CMP, CT lumbar and CTH to rule out any traumatic injuries.  - Elevated Cr and ALP/AST/ALT; Cr stable, last 7/16 was 1.8, 1/22 was 1.74. ALP stable 126 to 136 and mild elevated AST/ALT 44/55. She has no abdominal pain or RUQ pain. Exam abd soft nd nt (+) BS.   - CT lumbar showing age indeterminant compression fx T12, likely chronic.  - Patient pain well controlled and CT head was negative acute pathology.  - Called [facility] for collateral and advised / recommended her to have PT for back pain. 73F PMHx of Hypertension, hyperlipidemia, diabetes on insulin, TIA/CVA, anxiety/depression, Alzheimer's dementia, status post gastric bypass from [place], cholecystectomy, presenting with lower left back pain for the past day status post mechanical fall at [facility]. Per staff they report that they found her on the floor in her room complaining of back pain. Of note she has had chronic lower back pain for the past few months. She is reporting mild lower b/l lumbar pain nonradiating, that is positional. Otherwise, the patient denies any neck pain, headaches, chest pain, shortness of breath, n/v/d, abdominal pain, recent illness, fevers, chills, recent spinal/back surgeries or procedures, bowel or bladder incontinence, bowel or bladder retention, constipation, IV drug use, known cancer history, recent weight loss,   other subjective neurological deficits, numbness/tingling, dysuria, hematuria, rashes.     Pt p/w atraumatic low back pain with (-) fevers, chills, (-) saddle anesthesia or perianal numbness,(-) rash, (-) IVDU hx, (-) urinary or bowel incontinence/retention, or focal neurological deficits. DDx: Likely musculoskeletal back pain vs disc herniation/radiculopathy. DDX includes but is not limited to: cauda equina syndrome, vertebral compression fracture, epidural abscess, discitis, vertebral osteomyelitis, cord compression, or bone malignancy; but these are unlikely due to the HPI and exam.   - Pain control: steroids, lidocaine patch, NSAIDs, opioid PRN   - CBC/CMP, CT lumbar and CTH to rule out any traumatic injuries.  - Elevated Cr and ALP/AST/ALT; Cr stable, last 7/16 was 1.8, 1/22 was 1.74. ALP stable 126 to 136 and mild elevated AST/ALT 44/55. She has no abdominal pain or RUQ pain. Exam abd soft nd nt (+) BS.   - CT lumbar showing age indeterminant compression fx T12, likely chronic.  - Patient pain well controlled and CT head was negative acute pathology.  - Called [facility] for collateral and advised / recommended her to have PT for back pain.    - 500am patient with difficulty urinating, bladder scan bedside showing >600 ml. Placed cotton catheter and will have leg bag placed, follow up with urologist outpatient.

## 2024-09-23 NOTE — ED PROVIDER NOTE - OBJECTIVE STATEMENT
73F PMHx of Hypertension, hyperlipidemia, diabetes on insulin, TIA/CVA, anxiety/depression, Alzheimer's dementia, status post gastric bypass from [place], cholecystectomy, presenting with lower left back pain for the past day status post mechanical fall at [facility]. 73F PMHx of Hypertension, hyperlipidemia, diabetes on insulin, TIA/CVA, anxiety/depression, Alzheimer's dementia, status post gastric bypass from [place], cholecystectomy, presenting with lower left back pain for the past day status post mechanical fall at [facility]. Per staff they report that they found her on the floor in her room complaining of back pain. Of note she has had chronic lower back pain for the past few months. She is reporting mild lower b/l lumbar pain nonradiating, that is positional. Otherwise, the patient denies any neck pain, headaches, chest pain, shortness of breath, n/v/d, abdominal pain, recent illness, fevers, chills, recent spinal/back surgeries or procedures, bowel or bladder incontinence, bowel or bladder retention, constipation, IV drug use, known cancer history, recent weight loss,   other subjective neurological deficits, numbness/tingling, dysuria, hematuria, rashes.

## 2024-09-23 NOTE — ED ADULT NURSE NOTE - OBJECTIVE STATEMENT
Patient received from [facility] s/p fall. As per EMS, patient had a fall x1 month ago. Patient had a "pain spasm" tonight, causing her to fall. Fall was unwitnessed, however since fall endorsing 10/10 lower back pain. Unknown headstrike, unknown LOC. No other injuries endorsed or assessed. Patient is on plavix. PMHx stroke, HTN, dementia

## 2024-09-23 NOTE — ED PROVIDER NOTE - CARE PROVIDER_API CALL
Jose Dias  Urology  [address] [facility] [facility] [address], [address]  [place] [address]  Phone: [phone]  Fax: [phone]  Follow Up Time: